# Patient Record
Sex: MALE | URBAN - METROPOLITAN AREA
[De-identification: names, ages, dates, MRNs, and addresses within clinical notes are randomized per-mention and may not be internally consistent; named-entity substitution may affect disease eponyms.]

---

## 2022-12-02 ENCOUNTER — TELEPHONE (OUTPATIENT)
Dept: GASTROENTEROLOGY | Facility: CLINIC | Age: 67
End: 2022-12-02

## 2022-12-02 NOTE — TELEPHONE ENCOUNTER
Left message for patient to call to update demographic information before appointment on 12/6 w/Dr Patton Liter

## 2024-06-19 ENCOUNTER — TELEPHONE (OUTPATIENT)
Dept: NEUROLOGY | Facility: CLINIC | Age: 69
End: 2024-06-19

## 2024-06-21 ENCOUNTER — TELEPHONE (OUTPATIENT)
Dept: NEUROLOGY | Facility: CLINIC | Age: 69
End: 2024-06-21

## 2024-06-24 ENCOUNTER — CONSULT (OUTPATIENT)
Dept: NEUROLOGY | Facility: CLINIC | Age: 69
End: 2024-06-24
Payer: COMMERCIAL

## 2024-06-24 VITALS
BODY MASS INDEX: 25.59 KG/M2 | WEIGHT: 172.8 LBS | DIASTOLIC BLOOD PRESSURE: 80 MMHG | SYSTOLIC BLOOD PRESSURE: 140 MMHG | HEART RATE: 78 BPM | OXYGEN SATURATION: 97 % | HEIGHT: 69 IN

## 2024-06-24 DIAGNOSIS — R20.2 NUMBNESS AND TINGLING IN LEFT ARM: Primary | ICD-10-CM

## 2024-06-24 DIAGNOSIS — R20.0 NUMBNESS AND TINGLING IN LEFT ARM: Primary | ICD-10-CM

## 2024-06-24 PROCEDURE — 99204 OFFICE O/P NEW MOD 45 MIN: CPT | Performed by: PSYCHIATRY & NEUROLOGY

## 2024-06-24 RX ORDER — CYCLOBENZAPRINE HCL 5 MG
5 TABLET ORAL AS NEEDED
COMMUNITY
Start: 2024-06-19

## 2024-06-24 RX ORDER — FLUTICASONE PROPIONATE 50 MCG
1 SPRAY, SUSPENSION (ML) NASAL DAILY
COMMUNITY
Start: 2024-05-14

## 2024-06-24 RX ORDER — MELOXICAM 15 MG/1
15 TABLET ORAL DAILY
COMMUNITY
Start: 2023-12-15

## 2024-06-24 RX ORDER — ZOLPIDEM TARTRATE 5 MG/1
5 TABLET ORAL AS NEEDED
COMMUNITY
Start: 2024-06-20

## 2024-06-24 RX ORDER — ROSUVASTATIN CALCIUM 10 MG/1
10 TABLET, COATED ORAL DAILY
COMMUNITY
Start: 2024-06-19

## 2024-06-24 NOTE — PROGRESS NOTES
Maxim Archuleta is a 68 y.o. male.   Chief Complaint   Patient presents with    Numbness       Assessment:  1. Numbness and tingling in left arm         Plan:  Differential diagnosis discussed with the patient possible carpal tunnel syndrome/ulnar neuropathy would recommend an EMG of bilateral upper extremity to rule out the same, also discussed with patient that given that he had a head injury and his age and risk factors of hypercholesterolemia may be it is reasonable to check out an MRI scan of the brain and a carotid ultrasound to rule out any other etiology, patient is not keen to do any of the test and understand the risk as he is feeling better now.    I have advised him to avoid activities that can trigger his symptoms and to keep his blood pressure cholesterol and sugar under control stroke education was given to the patient, to go to the hospital if has any worsening symptoms and call me otherwise to see me back in 3 to 4 months or sooner if needed and follow-up with the other physicians.    I have reviewed the past medical history, surgical history, social and family history, current medications, allergies vitals, review of systems, and updated this information as appropriate today.      Subjective:    HPI   Patient is here for evaluation of numbness and tingling in the left arm, and states that he had a fall about a year ago where he hit his head in the bathroom but there was no episode of passing out he did not seek any medical attention and he felt fine after that about 6 months back he felt that he had a tingling sensation on the left side of the head and also radiating from his shoulder down into the arm it was on and off it was happening more so when he would sit and rest his arms on a chair he saw his primary care physician and had x-rays of the C-spine and was given a muscle relaxer and now he tells me that his symptoms are much better about 80% better but he would still have some occasional  "numbness and tingling in the left arm mostly from the elbows down into the index finger there is no neck discomfort there is no involvement of the face no vision difficulty no speech difficulty, no symptoms in the lower extremities no bowel and bladder incontinence, 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    Vitals:    06/24/24 1007   BP: 140/80   BP Location: Left arm   Patient Position: Sitting   Cuff Size: Large   Pulse: 78   SpO2: 97%   Weight: 78.4 kg (172 lb 12.8 oz)   Height: 5' 9\" (1.753 m)       Current Medications    Current Outpatient Medications:     cyclobenzaprine (FLEXERIL) 5 mg tablet, Take 5 mg by mouth if needed, Disp: , Rfl:     fluticasone (FLONASE) 50 mcg/act nasal spray, 1 spray into each nostril daily, Disp: , Rfl:     meloxicam (MOBIC) 15 mg tablet, Take 15 mg by mouth daily (Patient not taking: Reported on 6/24/2024), Disp: , Rfl:     rosuvastatin (CRESTOR) 10 MG tablet, Take 10 mg by mouth daily (Patient not taking: Reported on 6/24/2024), Disp: , Rfl:     zolpidem (AMBIEN) 5 mg tablet, Take 5 mg by mouth if needed (Patient not taking: Reported on 6/24/2024), Disp: , Rfl:       Allergies  Shellfish-derived products - food allergy    Past Medical History  History reviewed. No pertinent past medical history.      Past Surgical History:  History reviewed. No pertinent surgical history.      Family History:  History reviewed. No pertinent family history.    Social History:     I have reviewed the past medical history, surgical history, social and family history, current medications, allergies vitals, review of systems, and updated this information as appropriate today.     Objective:    Physical Exam    Neurological Exam     GENERAL:  Cooperative in no acute distress. Well-developed and well-nourished     HEAD and NECK   Head is atraumatic normocephalic with no lesions or masses. Neck is supple with full range of motion     CARDIOVASCULAR  Carotid Arteries-no carotid bruits.   "   NEUROLOGIC:  Mental Status-the patient is awake alert and oriented without aphasia or apraxia  Cranial Nerves: Visual fields are full to confrontation.  Visual acuity is 20/20 with hand-held chart extraocular movements are full without nystagmus. Pupils are 2-1/2 mm and reactive. Face is symmetrical to light touch. Movements of facial expression move symmetrically. Hearing is normal to finger rub bilaterally. Soft palate lifts symmetrically. Shoulder shrug is symmetrical. Tongue is midline without atrophy.  Motor: No drift is noted on arm extension. Strength is full in the upper and lower extremities with normal bulk and tone.  Sensory: Intact to temperature and vibratory sensation in the upper and lower extremities bilaterally. Cortical function is intact.  Coordination: Finger to nose testing is performed accurately. Romberg is negative. Gait reveals a normal base with symmetrical arm swing. Tandem walk is normal.  Reflexes:    2+ and symmetrical   toes are downgoing.  No spine tenderness    ROS:  Review of Systems   Constitutional:  Negative for appetite change, fatigue and fever.   HENT: Negative.  Negative for hearing loss, tinnitus, trouble swallowing and voice change.    Eyes: Negative.  Negative for photophobia, pain and visual disturbance.   Respiratory: Negative.  Negative for shortness of breath.    Cardiovascular: Negative.  Negative for palpitations.   Gastrointestinal: Negative.  Negative for nausea and vomiting.   Endocrine: Negative.  Negative for cold intolerance.   Genitourinary: Negative.  Negative for dysuria, frequency and urgency.   Musculoskeletal:  Negative for back pain, gait problem, myalgias, neck pain and neck stiffness.   Skin: Negative.  Negative for rash.   Allergic/Immunologic: Negative.    Neurological:  Positive for numbness (left side of head down into left arm and index finger). Negative for dizziness, tremors, seizures, syncope, facial asymmetry, speech difficulty, weakness,  light-headedness and headaches.   Hematological: Negative.  Does not bruise/bleed easily.   Psychiatric/Behavioral: Negative.  Negative for confusion, hallucinations and sleep disturbance.

## 2024-11-29 ENCOUNTER — TELEPHONE (OUTPATIENT)
Dept: NEUROLOGY | Facility: CLINIC | Age: 69
End: 2024-11-29